# Patient Record
(demographics unavailable — no encounter records)

---

## 2025-03-04 NOTE — HISTORY OF PRESENT ILLNESS
[FreeTextEntry1] : pt here for management of anxiety DM hld hypothyroid  [de-identified] : pt having trouble focusing at work pt is an  pt has always felt overwhelmed at school  has 1 child sees endo over thinking at work not sleeping well

## 2025-03-04 NOTE — ASSESSMENT
[FreeTextEntry1] : skylar continue lexapro adhd start adderal dm continue mounjaro hypothyroid continue synthroid rtc 1 mo

## 2025-03-04 NOTE — PHYSICAL EXAM
[No Acute Distress] : no acute distress [PERRL] : pupils equal round and reactive to light [Normal TMs] : both tympanic membranes were normal [Supple] : supple [Clear to Auscultation] : lungs were clear to auscultation bilaterally [Regular Rhythm] : with a regular rhythm [No Edema] : there was no peripheral edema [Normal Supraclavicular Nodes] : no supraclavicular lymphadenopathy [Normal Posterior Cervical Nodes] : no posterior cervical lymphadenopathy [Normal Anterior Cervical Nodes] : no anterior cervical lymphadenopathy [No CVA Tenderness] : no CVA  tenderness [No Joint Swelling] : no joint swelling [No Skin Lesions] : no skin lesions [No Focal Deficits] : no focal deficits [Normal Insight/Judgement] : insight and judgment were intact